# Patient Record
Sex: MALE | Race: WHITE | Employment: FULL TIME | URBAN - METROPOLITAN AREA
[De-identification: names, ages, dates, MRNs, and addresses within clinical notes are randomized per-mention and may not be internally consistent; named-entity substitution may affect disease eponyms.]

---

## 2021-09-25 ENCOUNTER — HOSPITAL ENCOUNTER (EMERGENCY)
Age: 51
Discharge: HOME | End: 2021-09-25
Payer: COMMERCIAL

## 2021-09-25 VITALS
HEART RATE: 88 BPM | WEIGHT: 210 LBS | OXYGEN SATURATION: 97 % | RESPIRATION RATE: 22 BRPM | DIASTOLIC BLOOD PRESSURE: 84 MMHG | TEMPERATURE: 98.1 F | HEIGHT: 69 IN | SYSTOLIC BLOOD PRESSURE: 135 MMHG | BODY MASS INDEX: 31.1 KG/M2

## 2021-09-25 DIAGNOSIS — R52 BODY ACHES: ICD-10-CM

## 2021-09-25 DIAGNOSIS — Z86.16 HISTORY OF COVID-19: Primary | ICD-10-CM

## 2021-09-25 DIAGNOSIS — R53.83 MALAISE AND FATIGUE: ICD-10-CM

## 2021-09-25 DIAGNOSIS — R53.81 MALAISE AND FATIGUE: ICD-10-CM

## 2021-09-25 LAB
EKG ATRIAL RATE: 92 BPM
EKG DIAGNOSIS: NORMAL
EKG P AXIS: 39 DEGREES
EKG P-R INTERVAL: 142 MS
EKG Q-T INTERVAL: 366 MS
EKG QRS DURATION: 78 MS
EKG QTC CALCULATION (BAZETT): 452 MS
EKG R AXIS: 22 DEGREES
EKG T AXIS: -6 DEGREES
EKG VENTRICULAR RATE: 92 BPM

## 2021-09-25 PROCEDURE — 93010 ELECTROCARDIOGRAM REPORT: CPT | Performed by: INTERNAL MEDICINE

## 2021-09-25 PROCEDURE — 93005 ELECTROCARDIOGRAM TRACING: CPT | Performed by: EMERGENCY MEDICINE

## 2021-09-25 PROCEDURE — 99283 EMERGENCY DEPT VISIT LOW MDM: CPT

## 2021-09-25 ASSESSMENT — ENCOUNTER SYMPTOMS
BACK PAIN: 1
ABDOMINAL PAIN: 0
COLOR CHANGE: 0
SHORTNESS OF BREATH: 0
NAUSEA: 1
COUGH: 0
EYES NEGATIVE: 1
VOMITING: 1

## 2021-09-25 NOTE — ED PROVIDER NOTES
[facility]  ED  EMERGENCY DEPARTMENT ENCOUNTER        Pt Name: Belinda Fountain  MRN: [id number]  Jeremygfnoa 1970  Date of evaluation: 9/25/2021  Provider: Arline Rand PA-C  PCP: No primary care provider on file. ED Attending: Lazaro Wray DO      This patient was not seen by the attending provider     History provided by the patient    CHIEF COMPLAINT:     Chief Complaint   Patient presents with    Fever     COVID + X 2 weeks ago, 99.9 t max, O2 98       HISTORY OF PRESENT ILLNESS:      Belinda Fountain is a 49 y.o. male who arrives to the ED by [facility] from home. Patient is here reporting he is getting over COVID-19 infection. He has been dealing with it for 2 weeks. He has been seen twice at the [facility] over the course of his illness. Patient states he thought he was over the worst when he woke up today and started experiencing some sweats. He took his temperature and found it to be 99.9 °F.  He states he was feeling body aches, mainly to his back and legs. He states he took a shower but was not feeling better. He had 1 episode of vomiting at home. Patient states he went back to bed and was trying to rest but states his eyes \"rolling around in his head\". He could not get comfortable. He called 911. Patient states he and his wife are both sick with Covid. He reports she was hospitalized for 5 days. He was at home by himself and \"did not have anyone to take care of him\". His wife just got home today. Patient cannot identify exacerbating or alleviating factors to the symptoms and by the time he does arrived to the ED, he is feeling much better. He denies being in any pain on arrival.  He no longer has the feeling of his \"eyes rolling around in his head. \"    Nursing Notes were reviewed     REVIEW OF SYSTEMS:     Review of Systems   Constitutional: Positive for fever (99.9). Negative for activity change, appetite change and chills. HENT: Negative. Eyes: Negative.     Respiratory: Negative for cough and shortness of breath. Cardiovascular: Negative for chest pain. Gastrointestinal: Positive for nausea and vomiting (x1). Negative for abdominal pain. Genitourinary: Negative. Musculoskeletal: Positive for back pain and myalgias. Skin: Negative for color change. Neurological: Negative for weakness and headaches. All other systems reviewed and are negative. Except as noted above in the ROS, all other systems were reviewed and negative. PAST MEDICAL HISTORY:   No past medical history on file. SURGICAL HISTORY:    No past surgical history on file. CURRENT MEDICATIONS:       There are no discharge medications for this patient. ALLERGIES:    Patient has no known allergies. FAMILY HISTORY:     No family history on file. SOCIAL HISTORY:       Social History     Socioeconomic History    Marital status:      Spouse name: Not on file    Number of children: Not on file    Years of education: Not on file    Highest education level: Not on file   Occupational History    Not on file   Tobacco Use    Smoking status: Never Smoker   Substance and Sexual Activity    Alcohol use: Yes    Drug use: Never    Sexual activity: Not on file   Other Topics Concern    Not on file   Social History Narrative    Not on file     Social Determinants of Health     Financial Resource Strain:     Difficulty of Paying Living Expenses:    Food Insecurity:     Worried About Running Out of Food in the Last Year:     [address] in the Last Year:    Transportation Needs:     Lack of Transportation (Medical):      Lack of Transportation (Non-Medical):    Physical Activity:     Days of Exercise per Week:     Minutes of Exercise per Session:    Stress:     Feeling of Stress :    Social Connections:     Frequency of Communication with Friends and Family:     Frequency of Social Gatherings with Friends and Family:     Attends [employer] Services:     Active Member of Clubs or Organizations:     Attends Club or Organization Meetings:     Marital Status:    Intimate Partner Violence:     Fear of Current or Ex-Partner:     Emotionally Abused:     Physically Abused:     Sexually Abused:        SCREENINGS:             PHYSICAL EXAM:       ED Triage Vitals   BP Temp Temp src Pulse Resp SpO2 Height Weight   09/25/21 0025 -- -- 09/25/21 0025 09/25/21 0025 09/25/21 0025 09/25/21 0023 09/25/21 0023   135/84   94 22 95 % 5' 9\" (1.753 m) 210 lb (95.3 kg)       Physical Exam    CONSTITUTIONAL: Awake and alert. Cooperative. Well-developed. Well-nourished. Non-toxic. No acute distress. HENT: Normocephalic. Atraumatic. External ears normal, without discharge. No nasal discharge. Oropharynx clear. Mucous membranes moist.  EYES: Conjunctiva non-injected. No scleral icterus. PERRL. EOM's grossly intact. NECK: Supple. Normal ROM. CARDIOVASCULAR: RRR. No Murmer. Intact distal pulses. PULMONARY/CHEST WALL: Effort normal. No tachypnea. Lungs clear to ausculation. ABDOMEN: Normal BS. Soft. Nondistended. No tenderness to palpate. No guarding. /ANORECTAL: Not assessed  MUSKULOSKELETAL: Normal ROM. No acute deformities. No edema. No tenderness to palpate. SKIN: Warm and dry. No rash. NEUROLOGICAL: Alert and oriented x 3. GCS 15. CN II-XII grossly intact. Strength is 5/5 in all extremities and sensation is intact. Normal gait. PSYCHIATRIC: Normal affect        DIAGNOSTICRESULTS:     LABS:    Results for orders placed or performed during the hospital encounter of 09/25/21   EKG 12 Lead   Result Value Ref Range    Ventricular Rate 92 BPM    Atrial Rate 92 BPM    P-R Interval 142 ms    QRS Duration 78 ms    Q-T Interval 366 ms    QTc Calculation (Bazett) 452 ms    P Axis 39 degrees    R Axis 22 degrees    T Axis -6 degrees    Diagnosis       Normal sinus rhythmNonspecific T wave abnormalityAbnormal ECGNo previous ECGs available       EKG:     The Ekg interpreted by me in the absence of a cardiologist shows. normal sinus rhythm with a rate of 92      See also interpretation by Dr. Robert Blum:   N/A    CRITICAL CARE TIME:       None      CONSULTS:  None      EMERGENCY DEPARTMENT COURSE and DIFFERENTIAL DIAGNOSIS/MDM:   Vitals:    Vitals:    09/25/21 0023 09/25/21 0025 09/25/21 0047 09/25/21 0050   BP:  135/84     Pulse:  94 88    Resp:  22     Temp:    98.1 °F (36.7 °C)   TempSrc:    Oral   SpO2:  95% 97%    Weight: 210 lb (95.3 kg)      Height: 5' 9\" (1.753 m)          Patient was given the following medications:  None    I have evaluated this patient in the ED. Old records were reviewed. Patient arrives in the emergency department telling me he is getting over Covid and 30 was over the worst of it when he began feeling ill again tonight. He chiefly complained of being febrile (T-max 99.9 °F), having some body aches and backache. He vomited once. He cannot get comfortable and called 911. Patient arrives with normal vital signs. He admits he is feeling much better. He has a normal physical exam.  An EKG was done on arrival showing normal sinus rhythm. Normal rate. No ischemic changes. Patient monitored in the emergency department for over an hour with no changes and at this point I feel is stable for discharge home. Patient told he may take weeks or longer to start feeling back to normal from his infection. Should breath, dyspnea fluids and plan to follow-up with his doctor. Return precautions discussed. I estimate there is LOW risk for ACS, CVA, SEPSIS, PE, BACTERIAL PNEUMONIA, INFLUENZA, MENINGITIS, thus I consider the discharge disposition reasonable. Also, there is no evidence or peritonitis, sepsis, or toxicity. Jerod Root and I have discussed the diagnosis and risks, and we agree with discharging home to follow-up with their primary doctor. We also discussed returning to the Emergency Department immediately if new or worsening symptoms occur.  We have discussed the symptoms which are most concerning (e.g., changing or worsening pain, trouble swallowing or breathing, neck stiffness, worsening fever) that necessitate immediate return. FINAL IMPRESSION:      1. History of COVID-19    2. Body aches    3. Malaise and fatigue          DISPOSITION/PLAN:   DISPOSITION     DISCHARGE    PATIENT REFERRED TO:  Follow up with your doctor at the [address] [place] [address]:  There are no discharge medications for this patient.                  (Please note thatportions of this note were completed with a voice recognition program.  Efforts were made to edit the dictations, but occasionally words are mis-transcribed.)    Leyla Arora PA-C (electronicallysigned)              Roni [place], [place]  09/25/21 4508

## 2021-09-25 NOTE — ED PROVIDER NOTES
Patient seen and evaluated by [facility]        EKG: Normal sinus rhythm with a rate of 92. Normal axis. Normal intervals and durations. Nonspecific T wave changes. No previous EKG.      Heraclio Munroe DO  09/25/21 1739

## 2021-09-25 NOTE — ED NOTES
Pt verbalized frustration that he has been \"sick with the COVID for 2 weeks\". Pt states wife was \"here 5 days and I didn't have anyone to take care of me\".        Leonarda Mckinney RN  09/25/21 2949

## 2021-09-25 NOTE — ED NOTES
Pt resting on cot in position of comfort. Respirations regular. Airway intact. GCS 15. Pt holding conversation with this RN w/o difficulty.  0 s/s of distress. Awaiting further orders. Will continue to monitor.        Kelley Peña RN  09/25/21 0115